# Patient Record
Sex: FEMALE | Race: WHITE | NOT HISPANIC OR LATINO | Employment: FULL TIME | URBAN - NONMETROPOLITAN AREA
[De-identification: names, ages, dates, MRNs, and addresses within clinical notes are randomized per-mention and may not be internally consistent; named-entity substitution may affect disease eponyms.]

---

## 2017-02-15 ENCOUNTER — OFFICE VISIT (OUTPATIENT)
Dept: RETAIL CLINIC | Facility: CLINIC | Age: 47
End: 2017-02-15

## 2017-02-15 VITALS
HEART RATE: 67 BPM | RESPIRATION RATE: 18 BRPM | OXYGEN SATURATION: 98 % | SYSTOLIC BLOOD PRESSURE: 120 MMHG | TEMPERATURE: 97.5 F | DIASTOLIC BLOOD PRESSURE: 90 MMHG | WEIGHT: 137 LBS

## 2017-02-15 DIAGNOSIS — N30.00 ACUTE CYSTITIS WITHOUT HEMATURIA: Primary | ICD-10-CM

## 2017-02-15 LAB
BILIRUB BLD-MCNC: NEGATIVE MG/DL
CLARITY, POC: ABNORMAL
COLOR UR: YELLOW
GLUCOSE UR STRIP-MCNC: NEGATIVE MG/DL
KETONES UR QL: NEGATIVE
LEUKOCYTE EST, POC: ABNORMAL
NITRITE UR-MCNC: POSITIVE MG/ML
PH UR: 5 [PH] (ref 5–8)
PROT UR STRIP-MCNC: NEGATIVE MG/DL
RBC # UR STRIP: ABNORMAL /UL
SP GR UR: 1.02 (ref 1–1.03)
UROBILINOGEN UR QL: NORMAL

## 2017-02-15 PROCEDURE — 81003 URINALYSIS AUTO W/O SCOPE: CPT | Performed by: NURSE PRACTITIONER

## 2017-02-15 PROCEDURE — 99203 OFFICE O/P NEW LOW 30 MIN: CPT | Performed by: NURSE PRACTITIONER

## 2017-02-15 RX ORDER — BUPROPION HYDROCHLORIDE 100 MG/1
300 TABLET ORAL 2 TIMES DAILY
COMMUNITY

## 2017-02-15 RX ORDER — NITROFURANTOIN 25; 75 MG/1; MG/1
100 CAPSULE ORAL 2 TIMES DAILY
Qty: 14 CAPSULE | Refills: 0 | Status: SHIPPED | OUTPATIENT
Start: 2017-02-15 | End: 2017-03-14

## 2017-02-15 NOTE — PROGRESS NOTES
Subjective   Kenia Nunez is a 46 y.o. female.   Chief Complaint   Patient presents with   • Urinary Tract Infection      Urinary Tract Infection    This is a new problem. The current episode started 1 to 4 weeks ago. The problem occurs every urination. The problem has been gradually worsening. The quality of the pain is described as burning. There has been no fever. There is no history of pyelonephritis. Associated symptoms include frequency and urgency. Pertinent negatives include no chills, flank pain, hematuria, nausea or vomiting. She has tried increased fluids for the symptoms. The treatment provided mild relief. Her past medical history is significant for recurrent UTIs (no recent UTI (greater than 60 days)). There is no history of kidney stones.        The following portions of the patient's history were reviewed and updated as appropriate: allergies, current medications, past family history, past medical history, past social history, past surgical history and problem list.    Current Outpatient Prescriptions:   •  buPROPion (WELLBUTRIN) 100 MG tablet, Take 300 mg by mouth 2 (Two) Times a Day. ER, Disp: , Rfl:   •  nitrofurantoin, macrocrystal-monohydrate, (MACROBID) 100 MG capsule, Take 1 capsule by mouth 2 (Two) Times a Day., Disp: 14 capsule, Rfl: 0    Review of Systems   Constitutional: Negative for appetite change, chills, fatigue and fever.   Gastrointestinal: Positive for abdominal pain (Mild suprapubic pain). Negative for diarrhea, nausea and vomiting.   Genitourinary: Positive for dysuria, frequency and urgency. Negative for flank pain, hematuria and vaginal discharge.   Musculoskeletal: Negative for back pain.   Skin: Negative for rash.     Visit Vitals   • /90 (BP Location: Left arm, Patient Position: Sitting, Cuff Size: Adult)   • Pulse 67   • Temp 97.5 °F (36.4 °C) (Temporal Artery )   • Resp 18   • Wt 137 lb (62.1 kg)   • SpO2 98%       Objective   No Known Allergies    Physical Exam    Constitutional: She appears well-developed and well-nourished. She does not appear ill. No distress.   HENT:   Head: Normocephalic and atraumatic.   Mouth/Throat: Oropharynx is clear and moist and mucous membranes are normal.   Cardiovascular: Normal rate and regular rhythm.    Pulmonary/Chest: Breath sounds normal. No respiratory distress.   Abdominal: Soft. Normal appearance. There is tenderness (mild) in the suprapubic area. There is no CVA tenderness.   Skin: Skin is warm and dry. No rash noted.       Assessment/Plan   Kenia was seen today for urinary tract infection.    Diagnoses and all orders for this visit:    Acute cystitis without hematuria  -     POC Urinalysis Dipstick, Automated    Other orders  -     nitrofurantoin, macrocrystal-monohydrate, (MACROBID) 100 MG capsule; Take 1 capsule by mouth 2 (Two) Times a Day.      Lab Results (last 24 hours)     Procedure Component Value Units Date/Time    POC Urinalysis Dipstick, Automated [[id number]]  (Abnormal) Collected:  02/15/17 1057    Specimen:  Urine Updated:  02/15/17 1058     Color Yellow      Clarity, UA Cloudy (A)      Glucose, UA Negative mg/dL      Bilirubin Negative      Ketones, UA Negative      Specific Gravity  1.020      Blood, UA Small (A)      pH, Urine 5.0      Protein, POC Negative mg/dL      Urobilinogen, UA Normal      Leukocytes Small (1+) (A)      Nitrite, UA Positive (A)

## 2017-02-15 NOTE — PATIENT INSTRUCTIONS
You have been diagnosed with a UTI or bladder infection. An antibiotic is prescribed for you today that needs to be taken until gone, whether or not you feel better. It is recommended you take a probiotic when taking an antibiotic. Probiotics are found over the counter in pill form and in some yogurt brands, both are recommended. Increase fluid intake such as water and low sugar cranberry juice. Avoid caffeine as it irritates the bladder wall. Take over the counter AZO Standard or prescription pyridium (if a prescription was given to you today) as directed for no more than 3 days, for bladder pain. This medication will turn your urine orange or red. Use motrin or Tylenol for fever/pain. For recurrent infections it is best to avoid bathing in a tub, always wipe front to back, urinate before and after intercourse, avoid tight fitting pants, and wear cotton underwear. Go to your primary care provider, urgent care center, or emergency room if no improvement or increase in symptoms such as fever, nausea, vomiting, flank pain or other concerns.

## 2017-03-14 ENCOUNTER — OFFICE VISIT (OUTPATIENT)
Dept: RETAIL CLINIC | Facility: CLINIC | Age: 47
End: 2017-03-14

## 2017-03-14 VITALS
TEMPERATURE: 98.1 F | WEIGHT: 135 LBS | RESPIRATION RATE: 18 BRPM | HEART RATE: 92 BPM | DIASTOLIC BLOOD PRESSURE: 70 MMHG | OXYGEN SATURATION: 99 % | SYSTOLIC BLOOD PRESSURE: 110 MMHG

## 2017-03-14 DIAGNOSIS — N30.00 ACUTE CYSTITIS WITHOUT HEMATURIA: Primary | ICD-10-CM

## 2017-03-14 LAB
BILIRUB BLD-MCNC: NEGATIVE MG/DL
CLARITY, POC: ABNORMAL
COLOR UR: YELLOW
GLUCOSE UR STRIP-MCNC: NEGATIVE MG/DL
KETONES UR QL: NEGATIVE
LEUKOCYTE EST, POC: ABNORMAL
NITRITE UR-MCNC: NEGATIVE MG/ML
PH UR: 6 [PH] (ref 5–8)
PROT UR STRIP-MCNC: NEGATIVE MG/DL
RBC # UR STRIP: ABNORMAL /UL
SP GR UR: 1.03 (ref 1–1.03)
UROBILINOGEN UR QL: NORMAL

## 2017-03-14 PROCEDURE — 81002 URINALYSIS NONAUTO W/O SCOPE: CPT | Performed by: NURSE PRACTITIONER

## 2017-03-14 PROCEDURE — 99213 OFFICE O/P EST LOW 20 MIN: CPT | Performed by: NURSE PRACTITIONER

## 2017-03-14 RX ORDER — SULFAMETHOXAZOLE AND TRIMETHOPRIM 800; 160 MG/1; MG/1
1 TABLET ORAL 2 TIMES DAILY
Qty: 14 TABLET | Refills: 0 | Status: SHIPPED | OUTPATIENT
Start: 2017-03-14 | End: 2017-03-21

## 2017-03-14 RX ORDER — BUSPIRONE HYDROCHLORIDE 10 MG/1
10 TABLET ORAL 3 TIMES DAILY
COMMUNITY

## 2017-03-14 NOTE — PATIENT INSTRUCTIONS
You have been diagnosed with a urinary tract infection.  An antibiotic has been prescribed and should be taken until completely gone.  Increase your fluid  Do not hold your urine.  If you get a urinary tract infection within the next 3 months you should follow up with dr long for further eval. And treatment.

## 2017-03-14 NOTE — PROGRESS NOTES
Subjective   Kenia Nunez is a 46 y.o. female.   Chief Complaint   Patient presents with   • Difficulty Urinating      History of Present Illness   Has had recurrent uti's.  She was treated a month ago for uti.  Began 2d ago with burning on urination. And some freq.  No abd or back pain.  No fever  No n/v  Was last treated with macrodantin.    The following portions of the patient's history were reviewed and updated as appropriate: allergies, current medications, past family history, past medical history, past social history, past surgical history and problem list.    Review of Systems   General; neg for fever  Neg for fatigue  Neg for illness exposure    Lungs;  Neg for cough  Neg for sob  Gi:  Neg for nvd  Gu:  Pos for dysuria  Pos for freq  Pos for uti 1 month ago      Objective   No Known Allergies    Physical Exam   General:  Awake and alert  nad  Does not appear acutely ill  Lungs;  cta  No use of accessory muscles  No ext cyanosis  Card:  ahr with rrr  No ectopy  No jvd  Abd/gu:  abd soft  bs present  nontender  No masses  No cva tenderness to percuss    Results for orders placed or performed in visit on 03/14/17   POC Urinalysis Dipstick   Result Value Ref Range    Color Yellow Yellow, Straw, Dark Yellow, Kassandra    Clarity, UA Cloudy (A) Clear    Glucose, UA Negative Negative, 1000 mg/dL (3+) mg/dL    Bilirubin Negative Negative    Ketones, UA Negative Negative    Specific Gravity  1.030 1.005 - 1.030    Blood, UA Trace (A) Negative    pH, Urine 6.0 5.0 - 8.0    Protein, POC Negative Negative mg/dL    Urobilinogen, UA Normal Normal    Leukocytes Small (1+) (A) Negative    Nitrite, UA Negative Negative         Assessment/Plan   Kenia was seen today for difficulty urinating.    Diagnoses and all orders for this visit:    Acute cystitis without hematuria  -     sulfamethoxazole-trimethoprim (BACTRIM DS) 800-160 MG per tablet; Take 1 tablet by mouth 2 (Two) Times a Day for 7 days.  -     POC Urinalysis  Dipstick